# Patient Record
Sex: FEMALE
[De-identification: names, ages, dates, MRNs, and addresses within clinical notes are randomized per-mention and may not be internally consistent; named-entity substitution may affect disease eponyms.]

---

## 2019-02-21 ENCOUNTER — HOSPITAL ENCOUNTER (OUTPATIENT)
Dept: HOSPITAL 53 - M SFHCLERA | Age: 29
End: 2019-02-21
Attending: NURSE PRACTITIONER
Payer: COMMERCIAL

## 2019-02-21 DIAGNOSIS — J02.9: Primary | ICD-10-CM

## 2019-09-27 ENCOUNTER — HOSPITAL ENCOUNTER (OUTPATIENT)
Dept: HOSPITAL 53 - M RAD | Age: 29
End: 2019-09-27
Attending: OBSTETRICS & GYNECOLOGY
Payer: COMMERCIAL

## 2019-09-27 DIAGNOSIS — E22.1: Primary | ICD-10-CM

## 2019-09-27 PROCEDURE — 70553 MRI BRAIN STEM W/O & W/DYE: CPT

## 2019-09-27 NOTE — REP
MRI brain without and with IV gadolinium:  Brain and pituitary study.

 

History:  Evaluate pituitary.  Persistent elevation of prolactin levels.

 

Technique:  Axial and sagittal imaging planes are utilized for T1 and T2-weighted

scans.  Sequences include spin-echo, fast spin echo, FLAIR, and diffusion

weighted sequences.

 

Gadolinium enhancement dose is 8 ml of intravenous ProHance.

 

MRI findings:  Bony calvarium is intact.  Craniocervical junction and upper

cervical cord are normal in appearance.  No intraorbital abnormality is seen.

There is no MR evidence of significant paranasal sinus disease.

 

Lateral, third, and fourth ventricles are normal in size and position.

Gray-white differentiation pattern is intact.  Diffusion weighted scans show no

evidence of restricted diffusion.  No abnormal white matter lesion is seen.

 

Pituitary gland is normal in size measuring 4 mm in thickness.  Pituitary stalk

is essentially midline and enhances normally.  There is no evidence of pituitary

microadenoma on dynamically acquired post contrast images.  Suprasellar cistern

and optic chiasm are unremarkable.  No vascular abnormality is observed.

Postcontrast images show no abnormal intracranial enhancement.

 

Impression:

 

Normal MRI study of the brain and pituitary.

 

 

Electronically Signed by

Terrence Gillis MD 09/30/2019 09:03 A